# Patient Record
Sex: FEMALE | HISPANIC OR LATINO | URBAN - METROPOLITAN AREA
[De-identification: names, ages, dates, MRNs, and addresses within clinical notes are randomized per-mention and may not be internally consistent; named-entity substitution may affect disease eponyms.]

---

## 2018-01-09 ENCOUNTER — APPOINTMENT (RX ONLY)
Dept: URBAN - METROPOLITAN AREA CLINIC 369 | Facility: CLINIC | Age: 83
Setting detail: DERMATOLOGY
End: 2018-01-09

## 2018-01-09 DIAGNOSIS — L29.8 OTHER PRURITUS: ICD-10-CM

## 2018-01-09 DIAGNOSIS — L29.89 OTHER PRURITUS: ICD-10-CM

## 2018-01-09 PROCEDURE — 99202 OFFICE O/P NEW SF 15 MIN: CPT

## 2018-01-09 PROCEDURE — ? COUNSELING

## 2018-01-09 PROCEDURE — ? TREATMENT REGIMEN

## 2018-01-09 PROCEDURE — ? PRESCRIPTION

## 2018-01-09 PROCEDURE — ? ORDER TESTS

## 2018-01-09 RX ORDER — HYDROXYZINE HYDROCHLORIDE 10 MG/1
TABLET, FILM COATED ORAL
Qty: 30 | Refills: 1 | Status: ERX | COMMUNITY
Start: 2018-01-09

## 2018-01-09 RX ORDER — EMOLLIENT COMBINATION NO.60
SPRAY GEL TOPICAL
Qty: 1 | Refills: 2 | Status: ERX | COMMUNITY
Start: 2018-01-09

## 2018-01-09 RX ADMIN — Medication: at 18:54

## 2018-01-09 RX ADMIN — HYDROXYZINE HYDROCHLORIDE: 10 TABLET, FILM COATED ORAL at 18:54

## 2018-01-09 ASSESSMENT — LOCATION DETAILED DESCRIPTION DERM
LOCATION DETAILED: RIGHT ANTERIOR DISTAL UPPER ARM
LOCATION DETAILED: EPIGASTRIC SKIN
LOCATION DETAILED: LEFT CLAVICULAR SKIN
LOCATION DETAILED: RIGHT VENTRAL PROXIMAL FOREARM
LOCATION DETAILED: LEFT ANTERIOR PROXIMAL UPPER ARM

## 2018-01-09 ASSESSMENT — LOCATION SIMPLE DESCRIPTION DERM
LOCATION SIMPLE: RIGHT FOREARM
LOCATION SIMPLE: LEFT CLAVICULAR SKIN
LOCATION SIMPLE: LEFT UPPER ARM
LOCATION SIMPLE: ABDOMEN
LOCATION SIMPLE: RIGHT UPPER ARM

## 2018-01-09 ASSESSMENT — LOCATION ZONE DERM
LOCATION ZONE: ARM
LOCATION ZONE: TRUNK

## 2018-01-09 NOTE — PROCEDURE: TREATMENT REGIMEN
Initiate Treatment: Apply Alevicyn spray to affected areas 3-5 times daily\\n\\nApply Cerave anti-itch moisturizing lotion as often as needed to help soothe itching \\n\\nTake 1 tablet of Hydroxyzine 10mg before bedtime to help with itching and sleep. Only take this in the evening time as this will make you drowsy\\n\\nI think you might benefit from seeing a counselor or psychiatrist. We will do some preliminary blood tests today to rule out other causes of itching and revisit this at your next visit.
Detail Level: Zone

## 2018-01-15 ENCOUNTER — APPOINTMENT (RX ONLY)
Dept: URBAN - METROPOLITAN AREA CLINIC 369 | Facility: CLINIC | Age: 83
Setting detail: DERMATOLOGY
End: 2018-01-15

## 2018-01-15 DIAGNOSIS — L29.89 OTHER PRURITUS: ICD-10-CM

## 2018-01-15 DIAGNOSIS — L29.8 OTHER PRURITUS: ICD-10-CM

## 2018-01-15 PROCEDURE — ? TREATMENT REGIMEN

## 2018-01-15 PROCEDURE — ? COUNSELING

## 2018-01-15 PROCEDURE — 99213 OFFICE O/P EST LOW 20 MIN: CPT

## 2018-01-15 ASSESSMENT — LOCATION DETAILED DESCRIPTION DERM
LOCATION DETAILED: LEFT VENTRAL DISTAL FOREARM
LOCATION DETAILED: LEFT ANTERIOR DISTAL UPPER ARM
LOCATION DETAILED: UPPER STERNUM
LOCATION DETAILED: RIGHT ANTERIOR PROXIMAL UPPER ARM
LOCATION DETAILED: RIGHT VENTRAL PROXIMAL FOREARM

## 2018-01-15 ASSESSMENT — LOCATION ZONE DERM
LOCATION ZONE: TRUNK
LOCATION ZONE: ARM

## 2018-01-15 ASSESSMENT — LOCATION SIMPLE DESCRIPTION DERM
LOCATION SIMPLE: RIGHT UPPER ARM
LOCATION SIMPLE: RIGHT FOREARM
LOCATION SIMPLE: CHEST
LOCATION SIMPLE: LEFT FOREARM
LOCATION SIMPLE: LEFT UPPER ARM

## 2018-01-15 NOTE — PROCEDURE: TREATMENT REGIMEN
Plan: Discussed bloodwork results that were WNL and show no signs of what may be causing this itching. This may be psychosomatic. It is my strong recommendation that you see a medical professional who specializes in helping you to feel better emotionally. Here are some facilities close by that I believe will be extremely helpful.\\n\\n[facility]\\[address] [place] [address]\\[phone]\\n\\n[facility]\\[address], [place], [place]\\[phone]\\n\\[facility]\\[address], [place], [place]\\[phone] ext: 150\\n\\n\\Manjula the meantime, continue with Alevicyn spray as needed for itching.  You can also apply the Cerave Anti-Itch lotion as often as needed. Plan: Discussed bloodwork results that were WNL and show no signs of what may be causing this itching. This may be psychosomatic. It is my strong recommendation that you see a medical professional who specializes in helping you to feel better emotionally. Here are some facilities close by that I believe will be extremely helpful.\\n\\n[facility]\\[address] [place] [address]\\[phone]\\n\\n[facility]\\[address], [place], [place]\\[phone]\\n\\[facility]\\[address], [place], [place]\\[phone] ext: 150\\n\\n\\Manjula the meantime, continue with Alevicyn spray as needed for itching.  You can also apply the Cerave Anti-Itch lotion as often as needed.

## 2018-01-23 ENCOUNTER — APPOINTMENT (RX ONLY)
Dept: URBAN - METROPOLITAN AREA CLINIC 369 | Facility: CLINIC | Age: 83
Setting detail: DERMATOLOGY
End: 2018-01-23

## 2018-01-23 DIAGNOSIS — L29.89 OTHER PRURITUS: ICD-10-CM

## 2018-01-23 DIAGNOSIS — L29.8 OTHER PRURITUS: ICD-10-CM

## 2018-01-23 DIAGNOSIS — E85.4 ORGAN-LIMITED AMYLOIDOSIS: ICD-10-CM

## 2018-01-23 PROCEDURE — ? PRESCRIPTION

## 2018-01-23 PROCEDURE — 99213 OFFICE O/P EST LOW 20 MIN: CPT

## 2018-01-23 PROCEDURE — ? COUNSELING

## 2018-01-23 PROCEDURE — ? TREATMENT REGIMEN

## 2018-01-23 RX ORDER — TRIAMCINOLONE ACETONIDE 1 MG/G
CREAM TOPICAL BID
Qty: 1 | Refills: 1 | Status: ERX | COMMUNITY
Start: 2018-01-23

## 2018-01-23 RX ADMIN — TRIAMCINOLONE ACETONIDE: 1 CREAM TOPICAL at 15:34

## 2018-01-23 ASSESSMENT — LOCATION SIMPLE DESCRIPTION DERM: LOCATION SIMPLE: UPPER BACK

## 2018-01-23 ASSESSMENT — LOCATION ZONE DERM: LOCATION ZONE: TRUNK

## 2018-01-23 ASSESSMENT — LOCATION DETAILED DESCRIPTION DERM: LOCATION DETAILED: INFERIOR THORACIC SPINE

## 2018-01-23 NOTE — PROCEDURE: TREATMENT REGIMEN
Plan: Patient has chronic itch, workup thus far negative. Strongly believe itch to be more psychosomatic in origin. \\n\\nRefer to Dr. Greg Minor \\n[facility]\\n[address], [place]\\[phone]\\n\\n\\Manjula the meantime, continue with Alevicyn spray as needed for itching.  You can also apply the Cerave Anti-Itch lotion and Triamcinolone as often as needed. Plan: Patient has chronic itch, workup thus far negative. Strongly believe itch to be more psychosomatic in origin. \\n\\nRefer to Dr. Greg Minor \\n[facility]\\[address], [place]\\[phone]\\n\\n\\Manjula the meantime, continue with Alevicyn spray as needed for itching.  You can also apply the Cerave Anti-Itch lotion and Triamcinolone as often as needed.